# Patient Record
(demographics unavailable — no encounter records)

---

## 2024-11-11 NOTE — REASON FOR VISIT
[CPE Visit] : a comprehensive physical exam. [Von Williebrand's Disease] : von williebrand's disease [Mother] : mother

## 2024-11-16 NOTE — HISTORY OF PRESENT ILLNESS
[de-identified] : Evie is here for a Comp visit No major issues Just has easy bruising but no nose bleeds and no other significant bleeding. Still pre menarchal.

## 2024-11-16 NOTE — ASSESSMENT
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 She is still pre menarchal.  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 It is the most common subtype of VWD and accounts for around 70-75% of patients. It is a partial quantitative deficiency with patients having some but decreased VW antigen. Individuals with VWD are at increased risk of bleeding. They can have increased bleeding after surgeries and other minor procedures, after trauma and the can have other bleeding manifestations outside of these situations and can experience epistaxis, abnormal uterine bleeding, gastrointestinal bleeding and post partum bleeding. The major options for treatment of bleeding and for kathi procedural prophylaxis is either desmopressin or von Willebrand factor concentrates depending on the subtype.  PLAN: Discussed with Mom that we will see her when she has her first period. Mom is worried that she will suffer the same fate as her. Reassured her that since we know about the diagnosis, we can pro actively manage her with antifirbinolytics and/or hormonal medications. Comp visit every 2 years, will see her earlier if she has menarche.

## 2024-11-16 NOTE — HISTORY OF PRESENT ILLNESS
[de-identified] : Evie is here for a Comp visit No major issues Just has easy bruising but no nose bleeds and no other significant bleeding. Still pre menarchal.